# Patient Record
Sex: MALE | Race: WHITE | NOT HISPANIC OR LATINO | Employment: OTHER | URBAN - METROPOLITAN AREA
[De-identification: names, ages, dates, MRNs, and addresses within clinical notes are randomized per-mention and may not be internally consistent; named-entity substitution may affect disease eponyms.]

---

## 2023-03-09 ENCOUNTER — TRANSFERRED RECORDS (OUTPATIENT)
Dept: HEALTH INFORMATION MANAGEMENT | Facility: CLINIC | Age: 78
End: 2023-03-09

## 2023-03-21 ENCOUNTER — TRANSFERRED RECORDS (OUTPATIENT)
Dept: HEALTH INFORMATION MANAGEMENT | Facility: CLINIC | Age: 78
End: 2023-03-21

## 2023-03-21 LAB
CHOLESTEROL (EXTERNAL): 191 MG/DL (ref 0–200)
CREATININE (EXTERNAL): 1 MG/DL (ref 0.7–1.2)
GFR ESTIMATED (EXTERNAL): 78 ML/MIN/1.73M2
GLUCOSE (EXTERNAL): 126 MG/DL (ref 74–106)
HBA1C MFR BLD: 5.1 % (ref 4–6)
HDLC SERPL-MCNC: 48 MG/DL
HEP C HIM: NORMAL
LDL CHOLESTEROL CALCULATED (EXTERNAL): 102 MG/DL
NON HDL CHOLESTEROL (EXTERNAL): 143 MG/DL
POTASSIUM (EXTERNAL): 4.3 MMOL/L (ref 3.5–5)
TRIGLYCERIDES (EXTERNAL): 203 MG/DL

## 2023-03-31 ENCOUNTER — TRANSCRIBE ORDERS (OUTPATIENT)
Dept: OTHER | Age: 78
End: 2023-03-31

## 2023-03-31 DIAGNOSIS — C44.311 BASAL CELL CARCINOMA OF SKIN OF NOSE: Primary | ICD-10-CM

## 2023-04-03 ENCOUNTER — TELEPHONE (OUTPATIENT)
Dept: DERMATOLOGY | Facility: CLINIC | Age: 78
End: 2023-04-03
Payer: COMMERCIAL

## 2023-04-03 NOTE — TELEPHONE ENCOUNTER
[facility] Call Center    Phone Message    May a detailed message be left on voicemail: yes     Reason for Call: Appointment Intake    Referring Provider Name[place] [address]  Ref phone: [phone]  Ref fax: [phone]  Diagnosis and/or Symptoms: R nasal ala with a nodular basal cell carcinoma  Basal cell carcinoma of skin of nose   Please call pt back to schedule  Thanks     Action Taken: Message routed to:  [facility] Derm    Travel Screening: Not Applicable

## 2023-04-03 NOTE — TELEPHONE ENCOUNTER
Called patient to schedule surgery with Dr. Fuentes    Date of Surgery: 05/22    Surgery type: Mohs    Consult scheduled: Yes    Has patient had mohs with us before? No    Additional comments: misael Worrell on 4/3/2023 at 12:11 PM

## 2023-04-03 NOTE — TELEPHONE ENCOUNTER
Left patient a voicemail to schedule a consult & mohs for R nasal ala with a nodular basal cell carcinoma with Dr. Fuentes. Provided my direct phone number.    Deneen Worrell on 4/3/2023 at 10:06 AM

## 2023-04-04 NOTE — TELEPHONE ENCOUNTER
FUTURE VISIT INFORMATION      FUTURE VISIT INFORMATION:    Date: 5.16.23    Time: 3:00    Location: [facility]  REFERRAL INFORMATION:    Referring provider:  Cathy    Referring providers clinic:  [facility]    Reason for visit/diagnosis  R nasal ala with a nodular basal cell carcinoma    RECORDS REQUESTED FROM:       Clinic name Comments Records Status Imaging Status   [facility]  [facility] # [id number] Media Tab Requested                                   Action 4.3.23 [facility]   Action Taken [phone].     Action 5.16.23 [facility]   Action Taken [facility] at [phone].    [facility]. Sent to [facility] for scanning into chart. Also put in Dr. Fuentes's Outside Labs tab.

## 2023-05-16 ENCOUNTER — OFFICE VISIT (OUTPATIENT)
Dept: DERMATOLOGY | Facility: CLINIC | Age: 78
End: 2023-05-16
Payer: COMMERCIAL

## 2023-05-16 ENCOUNTER — PRE VISIT (OUTPATIENT)
Dept: DERMATOLOGY | Facility: CLINIC | Age: 78
End: 2023-05-16
Payer: COMMERCIAL

## 2023-05-16 DIAGNOSIS — C44.311 BASAL CELL CARCINOMA (BCC) OF RIGHT ALA NASI: Primary | ICD-10-CM

## 2023-05-16 PROCEDURE — 99204 OFFICE O/P NEW MOD 45 MIN: CPT | Mod: GC | Performed by: DERMATOLOGY

## 2023-05-16 RX ORDER — DOXAZOSIN 2 MG/1
2 TABLET ORAL AT BEDTIME
COMMUNITY

## 2023-05-16 RX ORDER — PRAVASTATIN SODIUM 20 MG
20 TABLET ORAL DAILY
COMMUNITY

## 2023-05-16 RX ORDER — ACETAMINOPHEN 325 MG/1
325-650 TABLET ORAL EVERY 6 HOURS PRN
COMMUNITY

## 2023-05-16 ASSESSMENT — PAIN SCALES - GENERAL: PAINLEVEL: NO PAIN (0)

## 2023-05-16 NOTE — NURSING NOTE
Chief Complaint   Patient presents with     Derm Problem     Patient is here today for Mohs consult regarding right nasal ala BCC.     Parris CHONG RN

## 2023-05-16 NOTE — LETTER
5/16/2023       RE: Antwan Hernandez  [address] [place] [address]     Dear Colleague,    Thank you for referring your patient, Antwan Hernandez, to the [facility] DERMATOLOGIC SURGERY CLINIC [place] [facility]. Please see a copy of my visit note below.    Mohs Micrographic Surgery Consult Note    May 16, 2023    Dermatology Problem List:  1. BCC, right nasal ala, s/p bx 3/9/23    Subjective: The patient is a 77 year old man who presents today for Mohs micrographic surgery consultation for a recent diagnosis of skin cancer.    Skin cancer(s): BCC  Location(s): right nasal ala  Associated symptoms: none  Onset: within last 1 year    No other associated symptoms, modifying factors, or prior treatments, except when noted above. The patient denies any constitutional symptoms, lymphadenopathy, unintentional weight loss or decreased appetite. No other skin concerns today.    Objective:   Gen: This is a well appearing individual in no acute distress. The patient is alert and oriented x 3.  An exam of the face was performed today and visualized the following:  - 1.0 cm pearly papule on the right nasal ala    Assessment and Plan:     1. Plan for Mohs micrographic surgery for skin cancer(s) above:  - We discussed the nature of the diagnosis/condition above. We discussed the treatment options, including the risks benefits and expectations of these options. We recommend micrographic surgery as the most effective and most tissue sparing option for treatment, and the patient agrees to proceed with this.  The patient is aware of the risks, benefits and expectations of this procedure. The patient will be scheduled for this procedure, if not already done so.  - We anticipate the following closure type: Interpolation flap    The patient was discussed with and evaluated by attending physician, Jovanny Fuentes MD.    Wilfredo Marx MD  Dermatology, PGY-5  Mohs  surgery fellow    Scribe Disclosure:  I, OLIVIA LAMB, am serving as a scribe to document services personally performed by Jovanny Fuentes MD based on data collection and the provider's statements to me.     Attending Attestation  I attest that the Fellow recorded the interview and exam that I personally performed.  I have reviewed the note and edited it as necessary.    Jovanny Fuentes M.D.  Professor  Director of Dermatologic Surgery  Department of Dermatology  [facility]          Again, thank you for allowing me to participate in the care of your patient.      Sincerely,    Jovanny Fuentes MD

## 2023-05-16 NOTE — PROGRESS NOTES
Mohs Micrographic Surgery Consult Note    May 16, 2023    Dermatology Problem List:  1. BCC, right nasal ala, s/p bx 3/9/23    Subjective: The patient is a 77 year old man who presents today for Mohs micrographic surgery consultation for a recent diagnosis of skin cancer.    Skin cancer(s): BCC  Location(s): right nasal ala  Associated symptoms: none  Onset: within last 1 year    No other associated symptoms, modifying factors, or prior treatments, except when noted above. The patient denies any constitutional symptoms, lymphadenopathy, unintentional weight loss or decreased appetite. No other skin concerns today.    Objective:   Gen: This is a well appearing individual in no acute distress. The patient is alert and oriented x 3.  An exam of the face was performed today and visualized the following:  - 1.0 cm pearly papule on the right nasal ala    Assessment and Plan:     1. Plan for Mohs micrographic surgery for skin cancer(s) above:  - We discussed the nature of the diagnosis/condition above. We discussed the treatment options, including the risks benefits and expectations of these options. We recommend micrographic surgery as the most effective and most tissue sparing option for treatment, and the patient agrees to proceed with this.  The patient is aware of the risks, benefits and expectations of this procedure. The patient will be scheduled for this procedure, if not already done so.  - We anticipate the following closure type: Interpolation flap    The patient was discussed with and evaluated by attending physician, Jovanny Fuentes MD.    Wilfredo Marx MD  Dermatology, PGY-5  Mohs surgery fellow    Scribe Disclosure:  I, OLIVIA LAMB, am serving as a scribe to document services personally performed by Jovanny Fuentes MD based on data collection and the provider's statements to me.     Attending Attestation  I attest that the Fellow recorded the interview and exam that I personally performed.  I have reviewed the  note and edited it as necessary.    Jovanny Fuentes M.D.  Professor  Director of Dermatologic Surgery  Department of Dermatology  [facility]

## 2023-05-22 ENCOUNTER — OFFICE VISIT (OUTPATIENT)
Dept: DERMATOLOGY | Facility: CLINIC | Age: 78
End: 2023-05-22
Payer: COMMERCIAL

## 2023-05-22 VITALS — SYSTOLIC BLOOD PRESSURE: 143 MMHG | HEART RATE: 62 BPM | DIASTOLIC BLOOD PRESSURE: 80 MMHG

## 2023-05-22 DIAGNOSIS — C44.311 BASAL CELL CARCINOMA (BCC) OF RIGHT ALA NASI: Primary | ICD-10-CM

## 2023-05-22 PROCEDURE — 17311 MOHS 1 STAGE H/N/HF/G: CPT | Mod: GC | Performed by: DERMATOLOGY

## 2023-05-22 PROCEDURE — 15576 PEDICLE E/N/E/L/NTRORAL: CPT | Mod: GC | Performed by: DERMATOLOGY

## 2023-05-22 PROCEDURE — 21235 EAR CARTILAGE GRAFT: CPT | Mod: GC | Performed by: DERMATOLOGY

## 2023-05-22 ASSESSMENT — PAIN SCALES - GENERAL: PAINLEVEL: NO PAIN (0)

## 2023-05-22 NOTE — Clinical Note
Patient needs scheduled for takedown (afternoon procedure slot) in 3 weeks. I'm not sure why he has an appt with Gina scheduled for 5/30?

## 2023-05-22 NOTE — PROGRESS NOTES
[facility] Mohs Surgery Procedure Note    Case #: 1  Date of Service:  May 22, 2023  Surgery: Mohs micrographic surgery (MMS)  Staff surgeon: Jovanny Fuentes MD  Fellow surgeon: Wilfredo Marx MD  Resident surgeon: Corina Villareal MD  Nurse: Rebeca Sosa CMA    Tumor Type: BCC  Location: Right nasal ala  Derm-Path Accession #: outside path    Mohs Accession #:   Pre-Op Size: 1.1 cm x 1.1 cm  Final Defect Size: 1.1 cm x 1.1 cm  Number of Mohs stages: 1  Level of Defect: Fat  Local anesthetic: 15 mL 1% lidocaine with epinephrine  Repair Type: PNIF + cartilage batten graft  Repair Size: 4.8 cm x 2.3 cm (interpolation), 2.0 cm x 0.6 cm (graft)  Suture Material: 5-0 Monocryl; 4-0 Vicryl; 5-0 fast absorbing gut    Procedure:    Stage I  We discussed the principles of treatment and most likely complications including scarring, bleeding, infection, swelling, pain, crusting, nerve damage, large wound,  incomplete excision, wound dehiscence,  nerve damage, recurrence, and a second procedure may be recommended to obtain the best cosmetic or functional result.    Informed consent was obtained and the patient underwent the procedure as follows:  The patient was placed supine on the operating table.  The cancer was identified, outlined with a marker, and verified by the patient.  The entire surgical field was prepped with chlorhexidine.  The surgical site was anesthetized using lidocaine with epinephrine.    The area of clinically apparent tumor was debulked. The layer of tissue was then surgically excised using a #15 blade and was then transferred onto a specimen sheet maintaining the orientation of the specimen. Hemostasis was obtained using electrocoagulation. The wound site was then covered with a dressing while the tissue samples were processed for examination.    The excised tissue was transported to the Mohs histology laboratory maintaining the tissue orientation.  The tissue specimen was relaxed so  that the entire surgical margin was in a a single horizontal plane for sectioning and inked for precise mapping.  A precise reference map was drawn to reflect the sectioning of the specimen, colored inking of the margins, and orientation on the patient.  The tissue was processed using horizontal sectioning of the base and continuous peripheral margins.  The histopathologic sections were reviewed in conjunction with the reference map.    Total blocks: 1  Total slides: 2    There were no cancer cells visualized on examination, therefore Mohs surgery was complete.    REPAIR: Alar Cartilage Graft and Closure.     The patient was placed supine on the operating room table.  The area of incompetence was identified over the right nasal ala.  The right antihleix donor site was chosen, as this was an ideal reservoir of needed cartilage.  A design was drawn. A template was used to design the size of the covering flap.  The graft recipient site and donor site were then infiltrated with 1% lidocaine with epinephrine and both areas were prepped with Hibiclens and draped in a sterile fashion.    Hemostasis was obtained only by electrocoagulation. The donor site was then addressed.  The tissue was harvested using a #15 blade by excising to the level of cartilage.  A 2.0 x 0.6 cm piece of cartilage was then removed with a #15 blade.  The cartilage graft was placed on saline-soaked gauze.  The donor defects were closed after undermining bluntly in all directions and hemostasis obtained with electrocoagulation.  The wound edges were approximated using buried deep 5-0 Monocryl dermal sutures and closed using 5-0 FAG in a simple running fashion. Tacking sutures were used to prevent hematoma formation.     The cartilage graft was trimmed. The cartilage was secured into the alar defect as above with deep dermal Vicryl sutures. The overlying incision was closed with an interpolation flap (below).    RECONSTRUCTION: Paranasal Interpolation  Flap (PNIF)    The flap was then freed by undermining laterally and posteriorly to the flap.   The pedicle was tailored until enough mobility allowed total coverage of the defect.  Hemostasis was obtained with electrocoagulation. Wound edges were undermined broadly in all directions.  Hemostasis was again obtained with electrocoagulation. The secondary defect created with the PNIF was closed with 5-0 Monocryl buried dermal sutures and 5-0 fast absorbing gut epidermal sutures.    Movement of the pedicle flap to the original Mohs surgery defect was then addressed. The flap was first secured into the defect with 5-0 Monocryl buried dermal sutures.  It was further secured to the defect with 5-0 fast absorbing gut epidermal sutures.  Trimming and thinning to fit the defect and provide normal contour tailored the flap.  As possible, the edges of the flap were secured into the defect using additional simple interrupted epidermal sutures.  After the flap was fully secured, the flap color remained pink.  The direct pedicle was then dressed by wrapping with surgicel. The remaining wound edges were cleansed with saline, then ointment and a non-adherent pressure dressing was applied.  The patient left the operative suite in good condition. Wound care instructions were reviewed verbally and in writing with the patient.  The patient will be scheduled for a division and inset procedure in 3-weeks. Dermabrasion could be used if needed as the third stage of this reconstruction.     Jovanny Fuentes MD was immediately available for the entire surgery and was physicially present for the key portions of the procedure.     Dr. Wilfredo Marx (Mohs micrographic surgery fellow) performed the Mohs micrographic surgery and reconstruction under the direct supervision of Jovanny Fuentes MD, who was present for the entire micrographic surgery and key portions of the reconstruction, and always immediately available.    Wilfredo Marx MD  Dermatology,  PGY-5  Mohs surgery fellow      Attending attestation:  I was present for key elements of the procedure and immediately available for all other portions of the procedure.  I have reviewed the note and edited it as necessary.    Jovanny Fuentes M.D.  Professor  Director of Dermatologic Surgery  Department of Dermatology  [facility]    Dermatology Surgery Clinic  [facility] [place] [phone]

## 2023-05-22 NOTE — PATIENT INSTRUCTIONS

## 2023-05-22 NOTE — LETTER
5/22/2023       RE: Antwan Hernandez  [address] [place] [address]     Dear Colleague,    Thank you for referring your patient, Antwan Hernandez, to the [facility] DERMATOLOGIC SURGERY CLINIC [place] [facility]. Please see a copy of my visit note below.    [facility] Mohs Surgery Procedure Note    Case #: 1  Date of Service:  May 22, 2023  Surgery: Mohs micrographic surgery (MMS)  Staff surgeon: Jovanny Fuentes MD  Fellow surgeon: Wilfredo Marx MD  Resident surgeon: Corina Villareal MD  Nurse: Rebeca Sosa CMA    Tumor Type: BCC  Location: Right nasal ala  Derm-Path Accession #: outside path    Mohs Accession #:   Pre-Op Size: 1.1 cm x 1.1 cm  Final Defect Size: 1.1 cm x 1.1 cm  Number of Mohs stages: 1  Level of Defect: Fat  Local anesthetic: 15 mL 1% lidocaine with epinephrine  Repair Type: PNIF + cartilage batten graft  Repair Size: 4.8 cm x 2.3 cm (interpolation), 2.0 cm x 0.6 cm (graft)  Suture Material: 5-0 Monocryl; 4-0 Vicryl; 5-0 fast absorbing gut    Procedure:    Stage I  We discussed the principles of treatment and most likely complications including scarring, bleeding, infection, swelling, pain, crusting, nerve damage, large wound,  incomplete excision, wound dehiscence,  nerve damage, recurrence, and a second procedure may be recommended to obtain the best cosmetic or functional result.    Informed consent was obtained and the patient underwent the procedure as follows:  The patient was placed supine on the operating table.  The cancer was identified, outlined with a marker, and verified by the patient.  The entire surgical field was prepped with chlorhexidine.  The surgical site was anesthetized using lidocaine with epinephrine.    The area of clinically apparent tumor was debulked. The layer of tissue was then surgically excised using a #15 blade and was then transferred onto a specimen sheet  maintaining the orientation of the specimen. Hemostasis was obtained using electrocoagulation. The wound site was then covered with a dressing while the tissue samples were processed for examination.    The excised tissue was transported to the Mohs histology laboratory maintaining the tissue orientation.  The tissue specimen was relaxed so that the entire surgical margin was in a a single horizontal plane for sectioning and inked for precise mapping.  A precise reference map was drawn to reflect the sectioning of the specimen, colored inking of the margins, and orientation on the patient.  The tissue was processed using horizontal sectioning of the base and continuous peripheral margins.  The histopathologic sections were reviewed in conjunction with the reference map.    Total blocks: 1  Total slides: 2    There were no cancer cells visualized on examination, therefore Mohs surgery was complete.    REPAIR: Alar Cartilage Graft and Closure.     The patient was placed supine on the operating room table.  The area of incompetence was identified over the right nasal ala.  The right antihleix donor site was chosen, as this was an ideal reservoir of needed cartilage.  A design was drawn. A template was used to design the size of the covering flap.  The graft recipient site and donor site were then infiltrated with 1% lidocaine with epinephrine and both areas were prepped with Hibiclens and draped in a sterile fashion.    Hemostasis was obtained only by electrocoagulation. The donor site was then addressed.  The tissue was harvested using a #15 blade by excising to the level of cartilage.  A 2.0 x 0.6 cm piece of cartilage was then removed with a #15 blade.  The cartilage graft was placed on saline-soaked gauze.  The donor defects were closed after undermining bluntly in all directions and hemostasis obtained with electrocoagulation.  The wound edges were approximated using buried deep 5-0 Monocryl dermal sutures and  closed using 5-0 FAG in a simple running fashion. Tacking sutures were used to prevent hematoma formation.     The cartilage graft was trimmed. The cartilage was secured into the alar defect as above with deep dermal Vicryl sutures. The overlying incision was closed with an interpolation flap (below).    RECONSTRUCTION: Paranasal Interpolation Flap (PNIF)    The flap was then freed by undermining laterally and posteriorly to the flap.   The pedicle was tailored until enough mobility allowed total coverage of the defect.  Hemostasis was obtained with electrocoagulation. Wound edges were undermined broadly in all directions.  Hemostasis was again obtained with electrocoagulation. The secondary defect created with the PNIF was closed with 5-0 Monocryl buried dermal sutures and 5-0 fast absorbing gut epidermal sutures.    Movement of the pedicle flap to the original Mohs surgery defect was then addressed. The flap was first secured into the defect with 5-0 Monocryl buried dermal sutures.  It was further secured to the defect with 5-0 fast absorbing gut epidermal sutures.  Trimming and thinning to fit the defect and provide normal contour tailored the flap.  As possible, the edges of the flap were secured into the defect using additional simple interrupted epidermal sutures.  After the flap was fully secured, the flap color remained pink.  The direct pedicle was then dressed by wrapping with surgicel. The remaining wound edges were cleansed with saline, then ointment and a non-adherent pressure dressing was applied.  The patient left the operative suite in good condition. Wound care instructions were reviewed verbally and in writing with the patient.  The patient will be scheduled for a division and inset procedure in 3-weeks. Dermabrasion could be used if needed as the third stage of this reconstruction.     Jovanny Fuentes MD was immediately available for the entire surgery and was physicially present for the key portions  of the procedure.     Dr. Wilfredo Marx (Mohs micrographic surgery fellow) performed the Mohs micrographic surgery and reconstruction under the direct supervision of Jovanny Fuentes MD, who was present for the entire micrographic surgery and key portions of the reconstruction, and always immediately available.    Wilfredo Marx MD  Dermatology, PGY-5  Mohs surgery fellow      Attending attestation:  I was present for key elements of the procedure and immediately available for all other portions of the procedure.  I have reviewed the note and edited it as necessary.    Jovanny Fuentes M.D.  Professor  Director of Dermatologic Surgery  Department of Dermatology  [facility]    Dermatology Surgery Clinic  [facility]  [address] [place], [place] [id number]

## 2023-05-23 ENCOUNTER — TELEPHONE (OUTPATIENT)
Dept: DERMATOLOGY | Facility: CLINIC | Age: 78
End: 2023-05-23
Payer: COMMERCIAL

## 2023-05-23 NOTE — CONFIDENTIAL NOTE
Follow up call attempted following Mohs procedure with Dr. Fuentes       Are you having pain?   Are you taking pain medication?   Are you applying ice?    Have you had any noticeable bleeding through the bandage?    Do you have any other concerns?          Please call [phone] option 3 if you have any questions or concerns.

## 2023-05-23 NOTE — TELEPHONE ENCOUNTER
Per Dr. Marx: Patient needs scheduled for takedown (afternoon procedure slot) in 3 weeks. I'm not sure why he has an appt with Gina scheduled for 5/30?       Left vm.    Deneen Worrell, Procedure  5/23/2023 3:54 PM

## 2023-05-24 ENCOUNTER — TELEPHONE (OUTPATIENT)
Dept: DERMATOLOGY | Facility: CLINIC | Age: 78
End: 2023-05-24
Payer: COMMERCIAL

## 2023-05-24 NOTE — TELEPHONE ENCOUNTER
Patient is calling back to se if anyone can answer a question he had. Please call back to discuss.

## 2023-05-24 NOTE — TELEPHONE ENCOUNTER
[facility] Call Center    Phone Message    May a detailed message be left on voicemail: yes     Reason for Call: Pt calling in post surgery , pt asking [facility] Please call pt to discuss.       Action Taken: Message routed to:  [facility] derm surgery     Travel Screening: Not Applicable

## 2023-05-30 ENCOUNTER — OFFICE VISIT (OUTPATIENT)
Dept: DERMATOLOGY | Facility: CLINIC | Age: 78
End: 2023-05-30
Payer: COMMERCIAL

## 2023-05-30 DIAGNOSIS — C44.311 BASAL CELL CARCINOMA (BCC) OF RIGHT ALA NASI: Primary | ICD-10-CM

## 2023-05-30 DIAGNOSIS — Z48.89 ENCOUNTER FOR POSTOPERATIVE WOUND CHECK: ICD-10-CM

## 2023-05-30 PROCEDURE — 99024 POSTOP FOLLOW-UP VISIT: CPT | Mod: GC | Performed by: DERMATOLOGY

## 2023-05-30 ASSESSMENT — PAIN SCALES - GENERAL: PAINLEVEL: MODERATE PAIN (5)

## 2023-05-30 NOTE — NURSING NOTE
Dermatology Rooming Note    Antwan Hernandez's goals for this visit include:   Chief Complaint   Patient presents with     RECHECK     1 week wound check.      Janene Kovacs

## 2023-05-30 NOTE — LETTER
5/30/2023       RE: Antwan Hernandez  [address] [place] [address]     Dear Colleague,    Thank you for referring your patient, Antwan Hernandez, to the [facility] DERMATOLOGIC SURGERY CLINIC [place] at [facility]. Please see a copy of my visit note below.    Dermatologic Surgery Clinic Note    May 30, 2023    Dermatology Problem List:  1. BCC, right nasal ala, s/p [facility] 5/22/23, takedown pending    Subjective: The patient is a 77 year old man who presents today for a wound check after recent dermatologic surgery. No concerns for infection today. The patient continues with daily wound cares as recommended.    Patient reports site is doing well. Notes improving tenderness. He continues to wash it daily and dress it. He just wanted to make sure there is no infection.    No other associated symptoms, modifying factors, or prior treatments, except when noted above. The patient denies any constitutional symptoms, lymphadenopathy, unintentional weight loss or decreased appetite. No other skin concerns today.    Objective:   Gen: This is a well appearing individual in no acute distress. The patient is alert and oriented x 3.  An exam of the face was performed today and visualized the following:  - Well-perfused, healing interpolation flap of R cheek and R nasal ala. Small amount of inflammatory exudate on non-epithelial surface  - Healing graft donor site on R ear. No evidence of hematoma  - The surgical site noted above is clean, dry, and intact. There is no surrounding erythema, purulence, or significant tenderness to palpation. No clinical evidence of infection noted today.    Assessment and Plan:     # BCC, right nasal ala, s/p [facility] 5/22/23, takedown pending  - The patient's surgery site(s) is/are healing very well. No evidence of infection on examination today.  - The patient was told to continue with wound cares until the area(s) is/are no  longer crusted.   - Reassured on evidence of infection on exam today. Reviewed s/s that may suggest infection and to notify if these occur  - The patient should follow up with dermatologic surgery on 6/14 for takedown, as well as continue with regular skin exams in general dermatology clinic.    The patient was discussed with and evaluated by attending physician, Jovanny Fuentes MD.    Wilfredo Marx MD  Dermatology, PGY-5  Mohs surgery fellow    Attending Attestation  I attest that I discussed the case with the Fellow.  I agree with the plan.   I have reviewed the note and edited it as necessary.    Jovanny Fuentes M.D.  Professor  Director of Dermatologic Surgery  Department of Dermatology  [facility]

## 2023-05-30 NOTE — PROGRESS NOTES
Dermatologic Surgery Clinic Note    May 30, 2023    Dermatology Problem List:  1. BCC, right nasal ala, s/p [facility] 5/22/23, takedown pending    Subjective: The patient is a 77 year old man who presents today for a wound check after recent dermatologic surgery. No concerns for infection today. The patient continues with daily wound cares as recommended.    Patient reports site is doing well. Notes improving tenderness. He continues to wash it daily and dress it. He just wanted to make sure there is no infection.    No other associated symptoms, modifying factors, or prior treatments, except when noted above. The patient denies any constitutional symptoms, lymphadenopathy, unintentional weight loss or decreased appetite. No other skin concerns today.    Objective:   Gen: This is a well appearing individual in no acute distress. The patient is alert and oriented x 3.  An exam of the face was performed today and visualized the following:  - Well-perfused, healing interpolation flap of R cheek and R nasal ala. Small amount of inflammatory exudate on non-epithelial surface  - Healing graft donor site on R ear. No evidence of hematoma  - The surgical site noted above is clean, dry, and intact. There is no surrounding erythema, purulence, or significant tenderness to palpation. No clinical evidence of infection noted today.    Assessment and Plan:     # BCC, right nasal ala, s/p [facility] 5/22/23, takedown pending  - The patient's surgery site(s) is/are healing very well. No evidence of infection on examination today.  - The patient was told to continue with wound cares until the area(s) is/are no longer crusted.   - Reassured on evidence of infection on exam today. Reviewed s/s that may suggest infection and to notify if these occur  - The patient should follow up with dermatologic surgery on 6/14 for takedown, as well as continue with regular skin exams in general dermatology clinic.    The patient was discussed with and evaluated  by attending physician, Jovanny Fuentes MD.    Wilfredo Marx MD  Dermatology, PGY-5  Mohs surgery fellow    Attending Attestation  I attest that I discussed the case with the Fellow.  I agree with the plan.   I have reviewed the note and edited it as necessary.    Jovanny Fuentes M.D.  Professor  Director of Dermatologic Surgery  Department of Dermatology  [facility]

## 2023-06-14 ENCOUNTER — OFFICE VISIT (OUTPATIENT)
Dept: DERMATOLOGY | Facility: CLINIC | Age: 78
End: 2023-06-14
Payer: COMMERCIAL

## 2023-06-14 VITALS — DIASTOLIC BLOOD PRESSURE: 69 MMHG | HEART RATE: 57 BPM | SYSTOLIC BLOOD PRESSURE: 118 MMHG

## 2023-06-14 DIAGNOSIS — C44.311 BASAL CELL CARCINOMA (BCC) OF RIGHT ALA NASI: Primary | ICD-10-CM

## 2023-06-14 PROCEDURE — 15630 DELAY FLAP EYE/NOS/EAR/LIP: CPT | Mod: 58 | Performed by: DERMATOLOGY

## 2023-06-14 ASSESSMENT — PAIN SCALES - GENERAL: PAINLEVEL: NO PAIN (0)

## 2023-06-14 NOTE — PROGRESS NOTES
PROCEDURE: PNIF takedown     The patient was taken to the operative suite and placed supine  on the surgical suite procedure table.  The flap and pedicle were identified. The second stage of the melolabial interpolation flap / flap take down was planned, including debulking of the base of the flap. The area was infiltrated with 1% lidocaine with epinephrine. The area was then cleansed and prepped with chlorhexidine and draped with sterile drapes.    The superior and inferior portions of the flap pedicle were incised sharply with the central portion of the pedicle removed. Hemostasis was obtained with electrocautery. Additional clearing with chlorhexidine was performed. The base of the pedicle was trimmed with a cone of tissue removed from the medial cheek. The base of the pedicle was sutured into place using buried 5-0 Monocryl sutures and simple running 5-0 fast absorbing epidermal sutures.     Attention was then directed towards the distal flap at the nose. Additional incisions were made along the peripheral aspects of the flap and the flap was elevated. Careful, serial debulking of fibrofatty tissue was performed to achieve the appropriate thickness and texture to re-create the contours of the nasal dorsum, ala and tip. Careful hemostasis was obtained with electrocautery.     Once the appropriate debulking and trimming of the flap was achieved the flap was sutured into place using buried 5-0 Monocryl sutures and simple running epidermal 5-0 Fast absorbing sutures.     The incision lines were cleansed with saline and the wound was dressed with Vaseline and bandaged appropriately.  The patient left the operating suite in stable condition.   Wound care was reviewed verbally and in writing.    Wilfredo Marx MD  Dermatology, PGY-5  Mohs surgery fellow      Attending attestation:  I was present for key elements of the procedure and immediately available for all other portions of the procedure.  I have reviewed the  note and edited it as necessary.    Jovanny Fuentes M.D.  Professor  Director of Dermatologic Surgery  Department of Dermatology  [facility]    Dermatology Surgery Clinic  [facility] and Surgery [place] [id number]

## 2023-06-14 NOTE — PATIENT INSTRUCTIONS
Wound Care Instructions  I will experience scar, altered skin color, bleeding, swelling, pain, crusting and redness. I may experience altered sensation. Risks are excessive bleeding, infection, muscle weakness, thick (hypertrophic or keloidal) scar, and recurrence,. A second procedure may be recommended to obtain the best cosmetic or functional result.  Possible complications of any surgical procedure are bleeding, infection, scarring, alteration in skin color and sensation, muscle weakness in the area, wound dehiscence or seperation, or recurrence of the lesion or disease. On occasion, after healing, a secondary procedure or revision may be recommended in order to obtain the best cosmetic or functional result.   After your surgery, a pressure bandage will be placed over the area that has sutures. This will help prevent bleeding.   For the First 48 hours After Surgery:  Leave the pressure bandage on and keep it dry. If it should come loose, you may retape it, but do not take it off.  Relax and take it easy. Do not do any vigorous exercise, heavy lifting, or bending forward. This could cause the wound to bleed.  Post-operative pain is usually mild. You may take plain or extra strength Tylenol every 4 hours as needed (do not take more than 4,000mg in one day). Do not take any medicine that contains aspirin, ibuprofen or motrin unless you have been recommended these by a doctor.  Avoid alcohol and vitamin E as these may increase your tendency to bleed.  You may put an ice pack around the bandaged area for 20 minutes every 2-3 hours. This may help reduce swelling, bruising, and pain. Make sure the ice pack is waterproof so that the pressure bandage does not get wet.   You may see a small amount of drainage or blood on your pressure bandage. This is normal. However, if drainage or bleeding continues or saturates the bandage, you will need to apply firm pressure over the bandage with a washcloth for 15 minutes. If  bleeding continues after applying pressure for 15 minutes then go to the nearest emergency room.  48 Hours After Surgery  Carefully remove the bandage and start daily wound care and dressing changes. You may also now shower and get the wound wet. Wash wound with a mild soap and water.  Use caution when washing the wound. Be gentle and do not let the forceful shower stream hit the wound directly.  PAT dry.  Daily Wound Care:  Wash wound with a mild soap and water.  Use caution when washing the wound, be gentle and do not let the forceful shower stream hit the wound directly.  PAT DRY.  Apply Vaseline (from a new container or tube) over the suture line with a Q-tip. It is very important to keep the wound continuously moist, as wounds heal best in a moist environment.   Keep the site covered until sutures are removed, you can cover it with a Telfa (non-stick) dressing and tape or a band-aid.    If you are unable to keep wound covered, you must apply Vaseline every 2 - 3 hours (while awake) to ensure it is being kept moist for optimal healing. A dressing overnight is recommended to keep the area moist.   Call Us If:  You have pain that is not controlled with Tylenol.  You have signs or symptoms of an infection, such as: fever over 100 degrees F, redness, warmth, or foul-smelling or yellow/creamy drainage from the wound.  Who should I call with questions?  [facility]: [phone]   [facility]: [phone]  For urgent needs outside of business hours call the [facility] at [phone] and ask for the dermatology resident on call

## 2023-06-14 NOTE — NURSING NOTE
Dermatology Rooming Note    Antwan Hernandez's goals for this visit include:   Chief Complaint   Patient presents with     Derm Problem     Patient presents to the clinic today for a take down procedure to the right nostril.      Clemencia Ortiz LPN

## 2023-06-14 NOTE — LETTER
6/14/2023       RE: Antwan Hernandez  [address] [place] [address]     Dear Colleague,    Thank you for referring your patient, Antwan Hernandez, to the [facility] DERMATOLOGIC SURGERY CLINIC [place] at [facility]. Please see a copy of my visit note below.    PROCEDURE: PNIF takedown     The patient was taken to the operative suite and placed supine  on the surgical suite procedure table.  The flap and pedicle were identified. The second stage of the melolabial interpolation flap / flap take down was planned, including debulking of the base of the flap. The area was infiltrated with 1% lidocaine with epinephrine. The area was then cleansed and prepped with chlorhexidine and draped with sterile drapes.    The superior and inferior portions of the flap pedicle were incised sharply with the central portion of the pedicle removed. Hemostasis was obtained with electrocautery. Additional clearing with chlorhexidine was performed. The base of the pedicle was trimmed with a cone of tissue removed from the medial cheek. The base of the pedicle was sutured into place using buried 5-0 Monocryl sutures and simple running 5-0 fast absorbing epidermal sutures.     Attention was then directed towards the distal flap at the nose. Additional incisions were made along the peripheral aspects of the flap and the flap was elevated. Careful, serial debulking of fibrofatty tissue was performed to achieve the appropriate thickness and texture to re-create the contours of the nasal dorsum, ala and tip. Careful hemostasis was obtained with electrocautery.     Once the appropriate debulking and trimming of the flap was achieved the flap was sutured into place using buried 5-0 Monocryl sutures and simple running epidermal 5-0 Fast absorbing sutures.     The incision lines were cleansed with saline and the wound was dressed with Vaseline and bandaged appropriately.   The patient left the operating suite in stable condition.   Wound care was reviewed verbally and in writing.    Wilfredo Marx MD  Dermatology, PGY-5  Mohs surgery fellow      Attending attestation:  I was present for key elements of the procedure and immediately available for all other portions of the procedure.  I have reviewed the note and edited it as necessary.    Jovanny Fuentes M.D.  Professor  Director of Dermatologic Surgery  Department of Dermatology  [facility]    Dermatology Surgery Clinic  [facility] Surgery [place] [phone]

## 2023-06-15 ENCOUNTER — TELEPHONE (OUTPATIENT)
Dept: DERMATOLOGY | Facility: CLINIC | Age: 78
End: 2023-06-15
Payer: COMMERCIAL

## 2023-06-15 NOTE — TELEPHONE ENCOUNTER
Follow up call attempted following takedown procedure with Dr. Fuentes.     Patient's voicemail box is full, unable to leave voicemail at this time.      Parris CHONG RN

## 2024-05-22 ENCOUNTER — TRANSFERRED RECORDS (OUTPATIENT)
Dept: HEALTH INFORMATION MANAGEMENT | Facility: CLINIC | Age: 79
End: 2024-05-22

## 2024-07-08 ENCOUNTER — TRANSFERRED RECORDS (OUTPATIENT)
Dept: HEALTH INFORMATION MANAGEMENT | Facility: CLINIC | Age: 79
End: 2024-07-08
Payer: COMMERCIAL

## 2024-07-09 ENCOUNTER — TRANSCRIBE ORDERS (OUTPATIENT)
Dept: OTHER | Age: 79
End: 2024-07-09

## 2024-07-09 DIAGNOSIS — C44.311 BASAL CELL CARCINOMA OF SKIN OF NOSE: Primary | ICD-10-CM

## 2024-07-10 ENCOUNTER — TELEPHONE (OUTPATIENT)
Dept: DERMATOLOGY | Facility: CLINIC | Age: 79
End: 2024-07-10
Payer: COMMERCIAL

## 2024-07-10 NOTE — TELEPHONE ENCOUNTER
Called patient to schedule surgery with Dr. Fuentes    Date of Surgery: 07/29    Surgery type: Mohs    Consult scheduled: No    Has patient had mohs with us before? Yes    Additional comments: pt declined optional consult.       Deneen Worrell on 7/10/2024 at 12:07 PM

## 2024-07-22 ENCOUNTER — TELEPHONE (OUTPATIENT)
Dept: ENDOCRINOLOGY | Facility: CLINIC | Age: 79
End: 2024-07-22

## 2024-08-29 ENCOUNTER — TELEPHONE (OUTPATIENT)
Dept: DERMATOLOGY | Facility: CLINIC | Age: 79
End: 2024-08-29

## 2024-08-29 ENCOUNTER — OFFICE VISIT (OUTPATIENT)
Dept: DERMATOLOGY | Facility: CLINIC | Age: 79
End: 2024-08-29
Payer: COMMERCIAL

## 2024-08-29 VITALS — HEART RATE: 73 BPM | DIASTOLIC BLOOD PRESSURE: 73 MMHG | SYSTOLIC BLOOD PRESSURE: 131 MMHG

## 2024-08-29 DIAGNOSIS — C44.311 BASAL CELL CARCINOMA (BCC) OF DORSUM OF NOSE: Primary | ICD-10-CM

## 2024-08-29 PROCEDURE — 14060 TIS TRNFR E/N/E/L 10 SQ CM/<: CPT | Performed by: DERMATOLOGY

## 2024-08-29 PROCEDURE — 17311 MOHS 1 STAGE H/N/HF/G: CPT | Performed by: DERMATOLOGY

## 2024-08-29 NOTE — PROGRESS NOTES
[facility] Dermatologic Surgery Clinic [place] Procedure Note    Dermatology Problem List:  1. BCC, right nasal ala, s/p [facility]MS 5/22/23, s/p PNIF takedown 6/14/23  2. BCC, right nasal bridge, s/p shave bx 5/22/24, s/p MMS 8/29/24  3. BCC, right lateral shin, s/p shave bx 5/22/24  ___________________________________________________________    Date of Service:  Aug 29, 2024  Surgery: Mohs micrographic surgery    Case 1  Repair Type: Transposition repair  Repair Size: 1.5 x 2.0 cm  Suture Material: 5.0 monocryl, 5.0 vicryl rapide  Tumor Type: BCC  Location: Right nasal bridge  Derm-Path Accession #:   PreOp Size: 0.5 x 0.5 cm  PostOp Size: 1.3 x 1.2 cm  Mohs Accession #:   Level of Defect: Fascia      Procedure:  We discussed the principles of treatment and most likely complications including scarring, bleeding, infection, swelling, pain, crusting, nerve damage, large wound,  incomplete excision, wound dehiscence,  nerve damage, recurrence, and a second procedure may be recommended to obtain the best cosmetic or functional result.    Informed consent was obtained and the patient underwent the procedure as follows:  The patient was placed supine on the operating table.  The cancer was identified, outlined with a marker, and verified by the patient.  The entire surgical field was prepped with Hibiclens.  The surgical site was anesthetized using 1% lido with epi.      The area of clinically apparent tumor was not debulked. The layer of tissue was then surgically excised using a #15 blade and was then transferred onto a specimen sheet maintaining the orientation of the specimen. Hemostasis was obtained using hyfrecator. The wound site was then covered with a dressing while the tissue samples were processed for examination.    The excised tissue was transported to the Mohs histology laboratory maintaining the tissue orientation.  The tissue specimen was relaxed so that the entire surgical margin was  in a a single horizontal plane for sectioning and inked for precise mapping.  A precise reference map was drawn to reflect the sectioning of the specimen, colored inking of the margins, and orientation on the patient.  The tissue was processed using horizontal sectioning of the base and continuous peripheral margins.  The histopathologic sections were reviewed in conjunction with the reference map.    Total blocks: 1    Total slides:  2    Residual tumor was identified and indicated in red on the reference map, identifying the location where further tissue excision was necessary. Therefore, an additional stage of Mohs Micrographic surgery was deemed necessary.     Stage II   The patient was returned to the operating room, and the area prepped in the usual manner. The residual tumor was excised using the reference map as a guide. The specimen was transfered to a labeled specimen sheet maintaining the orientation of the specimen. Hemostasis was obtained and the wound site was covered with a dressing while the tissue was processed for examination.     The excised tissue was transported to the Mohs histology laboratory maintaining orientation. The specimen margins were inked for precise mapping and a reference map was prepared for the is additional stage to maintain precise orientation as described above. The tissue was processed using horizontal sectioning of the base and continuous peripheral margins. The histopathologic sections were reviewed in conjunction with the reference map.     Total blocks: 1    Total slides:  2    There were no cancer cells visualized on examination, therefore Mohs surgery was complete.     PROCEDURE: Rhombic Transposition Flap  The patient was taken to the operative suite and placed supine on the operating room table.  The wound was identified and infiltrated with 1% lido with epi.  The defect was then cleansed and prepped with Hibiclens and draped with sterile drapes.  Using a marker, a  rhomboid transposition flap repair was planned.  The wound edges were then debeveled and the wound was undermined bluntly in all directions.  The transposition flap was incised sharply to the level of fat.  The flap was undermined from all surrounding tissue. Hemostasis was obtained with hyfrecator.  The flap was transposed into the primary defect.  The secondary defect and flap closed with deep dermal  sutures. Epidermal tissue was carefully approximated using 5.0 Vicryl Rapide epidermal sutures throughout the length of the flap.  Redundant skin was excised by the triangulation technique, and closed in similar fashion.  The wound was cleansed with sterile saline and polysporin was applied. A sterile non-adherent pressure dressing was placed.  The patient left the operating suite in stable condition.   Wound care was reviewed verbally and in writing. The patient was counseled that revisionary procedures may need to be used as a second stage of this reconstruction.     Dr.Ian Fuentes was present for the entire procedure and always immediately available.    Staff Involved:   Scribe/Fellow/Staff     Scribe Disclosure:   I, HANH GOMES, am serving as a scribe; to document services personally performed by Jovanny Fuentes MD -based on data collection and the provider's statements to me.    Attending attestation:  I personally performed the entire procedure.  I have reviewed the note and edited it as necessary, and agree with its contents.    Jovanny Fuentes M.D.  Professor  Director of Dermatologic Surgery  Department of Dermatology  [facility]    Dermatology Surgery Clinic  [facility] Surgery [address] [address]

## 2024-08-29 NOTE — NURSING NOTE
Chief Complaint   Patient presents with    Derm Problem     Mohs and reconstruction-R nasal bridge BCC     Becky FLORES, RN  Dermatology Surgery  [phone]

## 2024-08-29 NOTE — LETTER
8/29/2024       RE: Antwan Hernandez  [address] [place] [address]     Dear Colleague,    Thank you for referring your patient, Antwan Hernandez, to the [facility] [place] at . Please see a copy of my visit note below.    [facility] [place] Procedure Note    Dermatology Problem List:  1. BCC, right nasal ala, s/p MMS 5/22/23, s/p PNIF takedown 6/14/23  2. BCC, right nasal bridge, s/p shave bx 5/22/24, s/p MMS 8/29/24  3. BCC, right lateral shin, s/p shave bx 5/22/24  ___________________________________________________________    Date of Service:  Aug 29, 2024  Surgery: Mohs micrographic surgery    Case 1  Repair Type: Transposition repair  Repair Size: 1.5 x 2.0 cm  Suture Material: 5.0 monocryl, 5.0 vicryl rapide  Tumor Type: BCC  Location: Right nasal bridge  Derm-Path Accession #:   PreOp Size: 0.5 x 0.5 cm  PostOp Size: 1.3 x 1.2 cm  Mohs Accession #:   Level of Defect: Fascia      Procedure:  We discussed the principles of treatment and most likely complications including scarring, bleeding, infection, swelling, pain, crusting, nerve damage, large wound,  incomplete excision, wound dehiscence,  nerve damage, recurrence, and a second procedure may be recommended to obtain the best cosmetic or functional result.    Informed consent was obtained and the patient underwent the procedure as follows:  The patient was placed supine on the operating table.  The cancer was identified, outlined with a marker, and verified by the patient.  The entire surgical field was prepped with Hibiclens.  The surgical site was anesthetized using 1% lido with epi.      The area of clinically apparent tumor was not debulked. The layer of tissue was then surgically excised using a #15 blade and was then transferred onto a specimen sheet maintaining the orientation of  the specimen. Hemostasis was obtained using hyfrecator. The wound site was then covered with a dressing while the tissue samples were processed for examination.    The excised tissue was [facility] maintaining the tissue orientation.  The tissue specimen was relaxed so that the entire surgical margin was in a a single horizontal plane for sectioning and inked for precise mapping.  A precise reference map was drawn to reflect the sectioning of the specimen, colored inking of the margins, and orientation on the patient.  The tissue was processed using horizontal sectioning of the base and continuous peripheral margins.  The histopathologic sections were reviewed in conjunction with the reference map.    Total blocks: 1    Total slides:  2    Residual tumor was identified and indicated in red on the reference map, identifying the location where further tissue excision was necessary. Therefore, an additional stage of Mohs Micrographic surgery was deemed necessary.     Stage II   The patient was returned to the operating room, and the area prepped in the usual manner. The residual tumor was excised using the reference map as a guide. The specimen was transfered to a labeled specimen sheet maintaining the orientation of the specimen. Hemostasis was obtained and the wound site was covered with a dressing while the tissue was processed for examination.     The excised tissue was transported to the [facility] histology laboratory maintaining orientation. The specimen margins were inked for precise mapping and a reference map was prepared for the is additional stage to maintain precise orientation as described above. The tissue was processed using horizontal sectioning of the base and continuous peripheral margins. The histopathologic sections were reviewed in conjunction with the reference map.     Total blocks: 1    Total slides:  2    There were no cancer cells visualized on examination,  therefore Mohs surgery was complete.     PROCEDURE: Rhombic Transposition Flap  The patient was taken to the operative suite and placed supine on the operating room table.  The wound was identified and infiltrated with 1% lido with epi.  The defect was then cleansed and prepped with Hibiclens and draped with sterile drapes.  Using a marker, a rhomboid transposition flap repair was planned.  The wound edges were then debeveled and the wound was undermined bluntly in all directions.  The transposition flap was incised sharply to the level of fat.  The flap was undermined from all surrounding tissue. Hemostasis was obtained with hyfrecator.  The flap was transposed into the primary defect.  The secondary defect and flap closed with deep dermal  sutures. Epidermal tissue was carefully approximated using 5.0 Vicryl Rapide epidermal sutures throughout the length of the flap.  Redundant skin was excised by the triangulation technique, and closed in similar fashion.  The wound was cleansed with sterile saline and polysporin was applied. A sterile non-adherent pressure dressing was placed.  The patient left the operating suite in stable condition.   Wound care was reviewed verbally and in writing. The patient was counseled that revisionary procedures may need to be used as a second stage of this reconstruction.     Dr.Ian Fuentes was present for the entire procedure and always immediately available.    Staff Involved:   Scribe/Fellow/Staff     Scribe Disclosure:   I, HANH GOMES, am serving as a scribe; to document services personally performed by Jovanny Fuentes MD -based on data collection and the provider's statements to me.    Attending attestation:  I personally performed the entire procedure.  I have reviewed the note and edited it as necessary, and agree with its contents.    Jovanny Fuentes M.D.  Professor  Director of Dermatologic Surgery  Department of Dermatology  [facility]    Dermatology Surgery  [facility]  [address] [place] [id number]      Again, thank you for allowing me to participate in the care of your patient.      Sincerely,    Jovanny Fuentes MD

## 2024-08-29 NOTE — PATIENT INSTRUCTIONS
Wound care    I will experience scar, bleeding, swelling, pain, crusting and redness. I may experience incomplete removal or recurrence. Risks are bleeding, bruising, swelling, infection, nerve damage, & a large wound. A second procedure may be recommended to obtain the best cosmetic or functional result.       A three month office visit with your Surgeon is recommended for scar evaluation. Please reach out sooner if you have concerns about you surgical site/wound.    Caring for your skin after surgery    After your surgery, a pressure bandage will be placed over the area that has stitches. This is important to prevent bleeding. Please follow these instructions over the next 1 to 2 weeks. Following this regimen will help to prevent complications as your wound heals.     For the first 48 hours after your surgery:    Leave the pressure dressing on and keep it dry. If it should come loose, you may re-tape it, but do not take it off.  Relax and take it easy. Do not do any vigorous exercise or heavy lifting. This could cause the wound to bleed.  Post-Operative pain is usually mild. If you are able to take tylenol, You may take plain or extra-strength Tylenol (acetaminophen) As directed on the bottle (do not take more than 4,000mg in one day). If you are able to take ibuprofen, you can alternate the tylenol and ibuprofen.   Avoid alcohol as this may increase your tendency to bleed.   You may put an ice pack around the bandaged area for 20 minutes at a time as needed. This may help reduce swelling, bruising, and pain. Make sure the ice pack is waterproof so that the pressure bandage doesn t get wet.  If the wound is on the face try to sleep with your head elevated. Either in a recliner or propped up in bed, this will decrease swelling around the eyes.   You may see a small amount of drainage or blood on your pressure bandage. This is normal. However:  If drainage or bleeding continues or saturates the bandage, you will  need to apply firm pressure over the bandage with a piece of gauze for 15 minutes.  If bleeding continues after applying pressure for 15 minutes, apply an ice pack to the bandaged area for 15 minutes.  If bleeding still continues, call our office or go to the nearest emergency room.    Remove pressure dressing 48 hours after surgery:    Carefully remove the pressure bandage. If it seems sticky or too difficult to get off, you may need to soak it off in the shower.  After the pressure dressing is removed, you may shower and get the wound wet. However, Do Not let the forceful stream of the shower hit the wound directly.  Follow these wound care and dressing change instructions:    You have skin glue over the stitches. This will dry and flake off with time (about 2 weeks). If the stitches are still hanging around after 2 weeks, let us know, we can clip them out for you if they do not fall out with washing.   You may allow water to run over the site. Do not soak.  Do Not rub or scrub the site.  Pat dry after the shower or bath.  Avoid topical medications, lotions, creams, ointment,or oils.  Do not use tanning lamps or expose the site to sun.   Check wound appearance daily, some swelling and redness is normal after a procedure but should go away as your would is healing. If the swelling and redness or pain increases or if any other signs of infection occur listed below please send in a photo via my chart and or call us to let us know.  The clear glue film should start peeling and flaking off approximately around 2 weeks. By this time your wound should be sufficiently healed. If it still looks to be healing when the glue comes off you can clean the wound with soapy warm water daily in the shower. No need to bandage further, but you can put a bandage on the area daily for the two weeks if you would like.   If skin glue and sutures are still intact at 2 weeks after your procedure, you can start applying Vaseline daily to  "help soften up the skin glue. It will come off easier this way.        If you are able to take acetaminophen (\"Tylenol,\" etc.) and ibuprofen (\"Advil\" or \"Motrin,\" etc.), then you may STAGGER these medications by taking 400 mg of ibuprofen (usually two tabs) every 8 hours and 1,000 mg of acetaminophen (e.g., two tabs of extra-strength Tylenol) every 8 hours.    This means, for example, that you could take the followin,000 mg of Tylenol, followed 4 hours later by 400 mg Ibuprofen, followed 4 hours later with 1,000 mg of Tylenol, followed 4 hours later by 400 mg Ibuprofen, followed 4 hours later with 1,000 mg of Tylenol, and so forth.     Essentially, you can take either 1,000 mg of Tylenol or 400 mg of ibuprofen in alternating fashion EVERY FOUR HOURS.    Do NOT exceed more than 4,000 mg of Tylenol or 3,200 mg of ibuprofen per 24 hours. If you are not able to take Tylenol or ibuprofen as above due to other health issues (or a physician has told you directly that you are not allowed to take one of them, say due to pre-existing severe liver or kidney issues), then disregard the above directions.    Scientific evidence supports that this combination/schedule of pain medications is just as effective, if not more effective, than taking a narcotic pain medicine.       Follow up will be a 3 month scar evaluation either in person or via a telephone visit with you sending in a photo via [employer]. Unless you have been told to follow up sooner or if you have concerns and would like to be see sooner. Please call or send us in a [employer] message if possible and attach a photo.        What to expect:    The first couple of days your wound may be tender and may bleed slightly when doing wound care.  There may be swelling and bruising around the wound, especially if it is near the eyes. For your comfort, you may apply ice or cold compresses to the bruises after your have removed the pressure bandage.  The area around your wound may " be numb for several weeks or even months.  You may experience periodic sharp pain or mild itching around the wound as it heals.   The suture line will look dark for a while but will lighten over time.       When to call us:    You have bleeding that will not stop after applying pressure and ice.  You have pain that is not controlled with Tylenol (acetaminophen.)  You have signs or symptoms of an infection such as:  Fever over 100 degrees Fahrenheit  Redness, warmth or foul-smelling drainage from the wound  If you have any questions, or are not sure how to take care of the wound.    Phone numbers:    During business hours (M-F 8:00-4:30 p.m.)  [facility]-  [phone] Option 1 appt. Desk and ask for the Dermatology Surgery Team  [phone] [facility] .     ---------------------------------------------------------  Evenings/Weekends/Holidays  Hospital - [phone]   TTY for hearing motjiajk-[phone]  *Ask  to page dermatologist on-call  Emergency Ndtf-[phone]  TTY for hearing impaired- [phone]

## 2024-08-29 NOTE — TELEPHONE ENCOUNTER
Follow up call attempted & left voicemail following Mohs procedure with Dr. Fuentes.       Are you having pain?   Are you taking pain medication?   Are you applying ice?    Have you had any noticeable bleeding through the bandage?    Do you have any other concerns?        Please call [phone] if you have any questions or concerns.    Becky FLORES RN  Dermatology Surgery  [phone]

## 2024-11-25 ENCOUNTER — OFFICE VISIT (OUTPATIENT)
Dept: DERMATOLOGY | Facility: CLINIC | Age: 79
End: 2024-11-25
Payer: COMMERCIAL

## 2024-11-25 DIAGNOSIS — C44.311 BASAL CELL CARCINOMA (BCC) OF DORSUM OF NOSE: Primary | ICD-10-CM

## 2024-11-25 RX ORDER — SERTRALINE HYDROCHLORIDE 100 MG/1
100 TABLET, FILM COATED ORAL
COMMUNITY
Start: 2024-10-08

## 2024-11-25 RX ORDER — TRAZODONE HYDROCHLORIDE 150 MG/1
150 TABLET ORAL
COMMUNITY
Start: 2024-04-26

## 2024-11-25 RX ORDER — KETOCONAZOLE 20 MG/ML
SHAMPOO, SUSPENSION TOPICAL
COMMUNITY
Start: 2024-05-21

## 2024-11-25 RX ORDER — IMIQUIMOD 12.5 MG/.25G
CREAM TOPICAL
COMMUNITY
Start: 2024-05-23

## 2024-11-25 NOTE — LETTER
11/25/2024       RE: Antwan Hernandez  [address] [place] [address]     Dear Colleague,    Thank you for referring your patient, Antwan Hernandez, to the [facility] [place] at [facility]. Please see a copy of my visit note below.    Patient presents for 3 monh flap check.    He is doing well.  He is happy with the result.    Jovanny Fuentes MD      Dermatologic Surgery Post-Op Scar Check     CC: Scar Management (3 month scar evaluation)      Dermatology Problem List:  1. BCC, right nasal ala, s/p MMS 5/22/23, s/p PNIF takedown 6/14/23  2. BCC, right nasal bridge, s/p shave bx 5/22/24, s/p MMS 8/29/24  3. BCC, right lateral shin, s/p shave bx 5/22/24    Subjective: Antwan Hernandez is a 79 year old male who presents today for scar evaluation after Mohs on 8/29/24.   - Patient is doing well. He is happy with the results.   - no other concerns today    Objective: An exam of the right nasal bridge was performed today   - right nasal bridge, there is a well approximated scar with slight erythema and slight webbing on the right nasal ala.         Assessment and Plan:     1. Post-surgical scar, right nasal bridge, s/p Mohs 8/29/24  - Surgical site healed well.  - The patient should follow up with dermatologic surgery PRN, as well as continue with regular skin exams in general dermatology clinic.    Patient was discussed with and evaluated by attending physician Dr. Jovanny Fuentes.    Scribe Disclosure:   I, Carly He, am serving as a scribe; to document services personally performed by Jovanny Fuentes MD -based on data collection and the provider's statements to me.     Provider Disclosure:  I agree with above History, Review of Systems, Physical exam and Plan.  I have reviewed the content of the documentation and have edited it as needed. I have personally performed the services documented here and the documentation  accurately represents those services and the decisions I have made.      Electronically signed by:  ***                Again, thank you for allowing me to participate in the care of your patient.      Sincerely,    Jovanny Fuentes MD

## 2024-11-25 NOTE — NURSING NOTE
Chief Complaint   Patient presents with    Scar Management     3 month scar evaluation     Becky FLORES RN  Dermatology Surgery

## 2024-11-25 NOTE — PROGRESS NOTES
Dermatologic Surgery Post-Op Scar Check     CC: Scar Management (3 month scar evaluation)      Dermatology Problem List:  1. BCC, right nasal ala, s/p MMS 5/22/23, s/p PNIF takedown 6/14/23  2. BCC, right nasal bridge, s/p shave bx 5/22/24, s/p MMS 8/29/24  3. BCC, right lateral shin, s/p shave bx 5/22/24    Subjective: Antwan Hernandez is a 79 year old male who presents today for scar evaluation after Mohs on 8/29/24.   - Patient is doing well. He is happy with the results.   - no other concerns today    Objective: An exam of the right nasal bridge was performed today   - right nasal bridge, there is a well approximated scar with slight erythema and slight webbing on the right nasal ala.         Assessment and Plan:     1. Post-surgical scar, right nasal bridge, s/p Mohs 8/29/24  - Surgical site healed well.  - The patient should follow up with dermatologic surgery PRN, as well as continue with regular skin exams in general dermatology clinic.    Patient was discussed with and evaluated by attending physician Dr. Jovanny Fuentes.    Scribe Disclosure:   I, Carly He, am serving as a scribe; to document services personally performed by Jovanny Fuentes MD -based on data collection and the provider's statements to me.     Provider Disclosure:  I agree with above History, Review of Systems, Physical exam and Plan.  I have reviewed the content of the documentation and have edited it as needed. I have personally performed the services documented here and the documentation accurately represents those services and the decisions I have made.      Electronically signed by:  Attending Attestation  I attest that the Scribe recorded the interview and exam that I personally performed.  I have reviewed the note and edited it as necessary.    Jovanny Fuentes M.D.  Professor  Director of Dermatologic Surgery  Department of Dermatology  [facility]

## 2024-11-25 NOTE — PROGRESS NOTES
Patient presents for 3 monh flap check.    He is doing well.  He is happy with the result.    Jovanny Fuentes MD